# Patient Record
Sex: FEMALE | Race: BLACK OR AFRICAN AMERICAN
[De-identification: names, ages, dates, MRNs, and addresses within clinical notes are randomized per-mention and may not be internally consistent; named-entity substitution may affect disease eponyms.]

---

## 2021-08-20 ENCOUNTER — HOSPITAL ENCOUNTER (OUTPATIENT)
Dept: HOSPITAL 76 - LAB.N | Age: 41
End: 2021-08-20
Attending: NURSE PRACTITIONER
Payer: COMMERCIAL

## 2021-08-20 DIAGNOSIS — R39.9: Primary | ICD-10-CM

## 2021-08-20 PROCEDURE — 87086 URINE CULTURE/COLONY COUNT: CPT

## 2021-08-20 PROCEDURE — 87181 SC STD AGAR DILUTION PER AGT: CPT

## 2022-10-19 ENCOUNTER — HOSPITAL ENCOUNTER (EMERGENCY)
Dept: HOSPITAL 76 - ED | Age: 42
Discharge: HOME | End: 2022-10-19
Payer: COMMERCIAL

## 2022-10-19 VITALS — SYSTOLIC BLOOD PRESSURE: 119 MMHG | DIASTOLIC BLOOD PRESSURE: 87 MMHG

## 2022-10-19 DIAGNOSIS — R51.9: Primary | ICD-10-CM

## 2022-10-19 PROCEDURE — 70450 CT HEAD/BRAIN W/O DYE: CPT

## 2022-10-19 PROCEDURE — 99284 EMERGENCY DEPT VISIT MOD MDM: CPT

## 2022-10-19 PROCEDURE — 99282 EMERGENCY DEPT VISIT SF MDM: CPT

## 2022-10-19 NOTE — CT REPORT
PROCEDURE:  HEAD WO

 

INDICATIONS:  HA

 

TECHNIQUE:  

Noncontrast 4.5 mm thick angled axial sections acquired from the foramen magnum to the vertex.  For r
adiation dose reduction, the following was used:  automated exposure control, adjustment of mA and/or
 kV according to patient size.

 

COMPARISON:  None.

 

FINDINGS:  

Image quality:  Excellent.  

 

CSF spaces:  Basal cisterns are patent.  No extra-axial fluid collections.  Ventricles are normal in 
size and shape.  

 

Brain:  No midline shift.  No intracranial masses or hemorrhage.  Gray-white matter interface is norm
al.  

 

Skull and face:  Calvarium and visualized facial bones are intact, without suspicious lesions.  

 

Sinuses:  Visualized sinuses and mastoids are clear.  

 

IMPRESSION:  No CT evidence of acute intracranial abnormalities.

 

Reviewed by: Mitchell Crawford MD on 10/19/2022 8:53 PM PDT

Approved by: Mitchell Crawford MD on 10/19/2022 8:53 PM PDT

 

 

Station ID:  [id number]

## 2022-10-19 NOTE — ED PHYSICIAN DOCUMENTATION
PD HPI HEADACHE





- Stated complaint


Stated Complaint: HEAD POUNDING





- Chief complaint


Chief Complaint: Neuro





- History obtained from


History obtained from: Patient





- History of Present Illness


Timing - onset: How many days ago (5)


Timing - details: Abrupt onset, Intermittant


Worst headache ever?: No: Worst headache ever?


Location: Front, Right


Quality: Throbbing, Aching, Stabbing


Associated symptoms: Vision changes.  No: Fever, Stiff neck, Nausea, Vomiting, 

Weakness, Numbness, Syncope, Seizure, Eye pain


Improved by: Other (sitting and/or lying down)


Worsened by: Other (headache reliably brought on with certain posisions (such as

leaning forward), or getting up/ standing from sitting/lying down)


Contributing factors: No: Anticoagulated, Hypertension, Recent illness, Trauma


Similar symptoms before: Has not had sx before


Recently seen: Not recently seen





- Additional information


Additional information: 





five days ago, patient had visual change in right eye which she describes as a 

line of bright light, lasted approximately an hour and then resolved. The 

following day , she developed right-sided headache which is episodic over past 

several days, seems to be brought on with certain positions such as leaning 

forward, as well as standing from sitting position or sometimes sitting up when 

lying down. The headache rapidly improves when she lies down or sits (if she was

standing). She reasoned perhaps she was dehydrated given the positional 

component, but she has had no change in symptoms despite significant increase in

PO fluids intake. 


Denies h/o similar symptoms. Denies injury, fever, nausea/vomiting. Took tylenol

without improvement. 


She has not had visual changes since the episode 5 days ago.





Review of Systems


Constitutional: reports: Reviewed and negative


Eyes: reports: Other (visual change as noted above, 5 days ago but resolved and 

not recurred).  denies: Loss of vision, Decreased vision, Photophobia, 

Discharge, Irritation


Nose: denies: Congestion, Sinus pressure / pain


Cardiac: reports: Reviewed and negative


Respiratory: reports: Reviewed and negative


GI: reports: Reviewed and negative


: denies: Now pregnant EGA


Musculoskeletal: denies: Neck pain


Neurologic: reports: Headache.  denies: Generalized weakness, Focal weakness, 

Numbness, Difficulty speaking, Confused, Altered mental status, Head injury, LOC





PD PAST MEDICAL HISTORY





- Past Medical History


Past Medical History: No





- Present Medications


Home Medications: 


                                Ambulatory Orders











 Medication  Instructions  Recorded  Confirmed


 


SUMAtriptan [Imitrex] 50 mg PO ONCE PRN #20 tablet 10/19/22 


 


traMADol [Ultram] 50 mg PO Q6H PRN #14 tablet 10/19/22 














- Allergies


Allergies/Adverse Reactions: 


                                    Allergies











Allergy/AdvReac Type Severity Reaction Status Date / Time


 


No Known Drug Allergies Allergy   Verified 10/19/22 19:39














PD ED PE NORMAL





- Vitals


Vital signs reviewed: Yes





- General


General: Alert and oriented X 3, No acute distress, Well developed/nourished





- HEENT


HEENT: PERRL, EOMI, Moist mucous membranes





- Neck


Neck: Supple, no meningeal sign





- Cardiac


Cardiac: RRR, No murmur





- Respiratory


Respiratory: No respiratory distress, Clear bilaterally





- Derm


Derm: Normal color, Warm and dry





- Neuro


Neuro: Alert and oriented X 3, CNs 2-12 intact, No motor deficit, No sensory 

deficit, Normal speech


Eye Opening: Spontaneous


Motor: Obeys Commands


Verbal: Oriented


GCS Score: 15





Results





- Vitals


Vitals: 


                               Vital Signs - 24 hr











  10/19/22 10/19/22 10/19/22





  18:29 21:32 21:33


 


Temperature 36.7 C 36.5 C 


 


Heart Rate 60 66 66


 


Respiratory 16 16 16





Rate   


 


Blood Pressure 132/77 H 119/87 H 119/87 H


 


O2 Saturation 100 99 98








                                     Oxygen











O2 Source                      Room air

















- Rads (name of study)


  ** CT head


Radiology: Prelim report reviewed, See rad report





PD MEDICAL DECISION MAKING





- ED course


Complexity details: reviewed results, re-evaluated patient, considered 

differential, d/w patient


ED course: 





CTH performed and without abnormality.


 No further testing indicated at this time. She is not in obvious/apparent 

distress. Doubt infectious cause (no fever, no other signs/symptoms to correlate

 with infectious cause (stiff neck, sore throat, myalgias, malaise). Doubt 

dehydration (patient says she significantly increased PO fluid intake and this 

did not make any difference). Doubt anemia (no other signs/symptoms to 

correlate, such as weakness, dyspnea). Doubt electrolyte abnormality 

(specifically hyponatremia, with lack of any correlating signs/symptoms such as 

insomnia, difficulty with mental focus, nausea). 


Migraine is certainly possibility, particularly given visual change at onset, 

but headache did not start until the following day, and lack of other elements 

such as nausea, phono/photophobia, and intermittent nature of the headache, all 

make migraine less likely. 





Departure





- Departure


Disposition: 01 Home, Self Care


Clinical Impression: 


 Headache





Condition: Good


Instructions:  ED Cephalgia Unspecified


Prescriptions: 


SUMAtriptan [Imitrex] 50 mg PO ONCE PRN #20 tablet


 PRN Reason: Headache


traMADol [Ultram] 50 mg PO Q6H PRN #14 tablet


 PRN Reason: Headache


Comments: 


The CT scan of your head was unremarkable. The cause of your headache is not 

apparent at this time. 


You have been given a dose of imitrex, which sometimes helps with migraine 

headache. As we discussed, some elements of your headache suggest migraine 

headache and other descriptions would be atypical for migraine headache. 


Prescriptions for imitrex and tramadol have been electronically submitted to 

[facility] pharmacy in [place]. If the dose of imitrex tonight does not help 

with your headache, you can instead use the tramadol. 


Follow up with your primary care provider. Further testing might be needed, and 

a referral to a neurologist might be helpful if your headaches persist.


Discharge Date/Time: 10/19/22 21:35

## 2023-08-22 ENCOUNTER — HOSPITAL ENCOUNTER (OUTPATIENT)
Dept: HOSPITAL 76 - DI.N | Age: 43
Discharge: HOME | End: 2023-08-22
Payer: COMMERCIAL

## 2023-08-22 DIAGNOSIS — Z80.3: ICD-10-CM

## 2023-08-22 DIAGNOSIS — Z12.31: Primary | ICD-10-CM

## 2023-08-23 NOTE — MAMMOGRAPHY REPORT
BILATERAL DIGITAL SCREENING MAMMOGRAM 3D/2D: 8/22/2023

 

CLINICAL: Baseline exam. Routine screening.  

 

No prior exams were available for comparison.  

 

There are scattered areas of fibroglandular density in both breasts (category b / 25%-50% glandular t
issue).  

 

 

No significant masses, calcifications, or other findings are seen in either breast.  

 

IMPRESSION: NEGATIVE

There is no mammographic evidence of malignancy. A 1 year screening mammogram is recommended.  

 

Based on the Tyrer Cuzick model (a risk assessment model) the patients lifetime risk is 10.6% and he
r 10 year risk is 1.3%. According to the ACR, ACS, and NCCN guidelines, an annual breast MRI exam janneth
ng with mammogram is recommended if the patients lifetime risk is 20% or greater.

 

 

This exam was interpreted at Station ID: [id number].  

 

NOTE: For mammograms, a report in lay terms will be sent to the patient. Approximately 15% of breast 
malignancies will not be visualized mammographically. In the management of a palpable breast mass, a 
negative mammogram must not discourage biopsy of a clinically suspicious lesion.

 

Electronically Signed By: Allison marshall/lisa:8/23/2023 08:04:02  

 

 

letter sent: No_Letter  

ACR BI-RADS Category 1: Negative 3341F

PARENCHYMAL PATTERN: (A) - The breast(s) demonstrate(s) scattered fibroglandular densities.

BI-RADS CATEGORY: (1) - 1

Mammogram

[id number]

1 year screening

LATERALITY: (B)